# Patient Record
Sex: FEMALE | Race: WHITE | NOT HISPANIC OR LATINO | Employment: FULL TIME | URBAN - NONMETROPOLITAN AREA
[De-identification: names, ages, dates, MRNs, and addresses within clinical notes are randomized per-mention and may not be internally consistent; named-entity substitution may affect disease eponyms.]

---

## 2023-06-07 ENCOUNTER — OFFICE VISIT (OUTPATIENT)
Dept: PRIMARY CARE | Facility: CLINIC | Age: 28
End: 2023-06-07
Payer: MEDICAID

## 2023-06-07 ENCOUNTER — TELEPHONE (OUTPATIENT)
Dept: PRIMARY CARE | Facility: CLINIC | Age: 28
End: 2023-06-07

## 2023-06-07 VITALS
WEIGHT: 127 LBS | OXYGEN SATURATION: 99 % | HEART RATE: 70 BPM | BODY MASS INDEX: 20.41 KG/M2 | SYSTOLIC BLOOD PRESSURE: 101 MMHG | HEIGHT: 66 IN | DIASTOLIC BLOOD PRESSURE: 68 MMHG

## 2023-06-07 DIAGNOSIS — Z13.9 SCREENING FOR CONDITION: Primary | ICD-10-CM

## 2023-06-07 DIAGNOSIS — F41.9 ANXIETY: Primary | ICD-10-CM

## 2023-06-07 DIAGNOSIS — R21 RASH: ICD-10-CM

## 2023-06-07 PROCEDURE — 99214 OFFICE O/P EST MOD 30 MIN: CPT | Performed by: FAMILY MEDICINE

## 2023-06-07 PROCEDURE — 1036F TOBACCO NON-USER: CPT | Performed by: FAMILY MEDICINE

## 2023-06-07 RX ORDER — CLOTRIMAZOLE AND BETAMETHASONE DIPROPIONATE 10; .64 MG/G; MG/G
CREAM TOPICAL
Qty: 15 G | Refills: 0 | Status: SHIPPED | OUTPATIENT
Start: 2023-06-07

## 2023-06-07 RX ORDER — SERTRALINE HYDROCHLORIDE 25 MG/1
25 TABLET, FILM COATED ORAL
Qty: 30 TABLET | Refills: 1 | Status: SHIPPED | OUTPATIENT
Start: 2023-06-07 | End: 2023-08-14 | Stop reason: SDUPTHER

## 2023-06-07 RX ORDER — HYDROXYZINE HYDROCHLORIDE 10 MG/1
10 TABLET, FILM COATED ORAL EVERY 8 HOURS PRN
Qty: 30 TABLET | Refills: 0 | Status: SHIPPED | OUTPATIENT
Start: 2023-06-07 | End: 2023-06-17

## 2023-06-07 RX ORDER — HYDROXYZINE PAMOATE 25 MG/1
25 CAPSULE ORAL 3 TIMES DAILY PRN
COMMUNITY
End: 2023-06-07 | Stop reason: ALTCHOICE

## 2023-06-07 RX ORDER — SERTRALINE HYDROCHLORIDE 25 MG/1
25 TABLET, FILM COATED ORAL
COMMUNITY
End: 2023-06-07 | Stop reason: SDUPTHER

## 2023-06-07 ASSESSMENT — PATIENT HEALTH QUESTIONNAIRE - PHQ9
2. FEELING DOWN, DEPRESSED OR HOPELESS: SEVERAL DAYS
1. LITTLE INTEREST OR PLEASURE IN DOING THINGS: SEVERAL DAYS
SUM OF ALL RESPONSES TO PHQ9 QUESTIONS 1 AND 2: 2

## 2023-06-07 NOTE — PROGRESS NOTES
"Subjective     Dahiana Rey is a 27 y.o. female who presents for Dermatitis (patchy, itchy, generalized) and Depression (Would like to restart zoloft and vistaril-has been off for two years).      HPI  The patient is a 27 year-old female presenting to the clinic with complaints of dermatitis.  Discussed medication.  Patient presents with complaints of anxiety. Take  medication as directed.  Complaining anxiety and stress.  Feeling stressed and overwhelmed.  Denies depression.  Denies suicidal.  Denies homicidal.  Feeling stressed.  Complaining skin rash dry scaly skin rash on the upper and lower extremities and on the trunk.  Causing itching.    Review of Systems  Review of systems:    General:  Denies fever.  Denies chills.    HEENT: Denies nasal congestion.  Denies sinus pressure.    Respiratory:  Denies cough.  Denies shortness of breath.    Cardiovascular:  Denies chest pain.  Denies heart palpitations.  Denies shortness of breath.    Gastrointestinal:  Denies nausea vomiting diarrhea.  Denies abdominal pain.    Genitourinary: Denies burning urination.  Denies frequent urination.  Denies flank pain.  Denies blood in the urine.  Denies abnormal vaginal discharge.    Neurology:  Denies tingling numbness but denies weakness.  Denies headache.  Denies blurred vision.    Musculoskeletal:  Denies body aches.  Denies joint pains.  Denies muscle aches.  Denies muscle weakness    Endocrinology: Dictated as above.    Psychiatric: Dictated as above    Skin.  Dictated as above    Objective   /68   Pulse 70   Ht 1.676 m (5' 6\")   Wt 57.6 kg (127 lb)   SpO2 99%   BMI 20.50 kg/m²          Physical Exam  General.  Not in distress.  HEENT normocephalic anicteric sclerae.  Neck soft supple no thyromegaly.  Lungs are clear.  Heart regular.  Abdomen soft nontender nondistended bowel sounds are positive.  Extremities no clubbing cyanosis or edema.  Psychiatric.  Has good eye contact.  No crying spells noted.  Speech was " normal.  Denies depression.  Denies suicidal.  Denies homicidal.    homicidal.  Complaining anxiety and stress.  Denies depression.  Denies suicidal.  Denies homicidal.  Skin exam.  Dry scaly skin rash noted on upper and lower extremities and on the trunk.  Consistent with a fungal dermatitis.  Assessment/Plan   1.  Anxiety.  Counseled for anxiety and stress.  Denies depression.  Denies suicidal.  Denies homicidal.  Explained adverse course of medication.  Caution advised about medication including effects on pregnancy.  Does not wish to go for urine pregnancy test.    2.  Skin rash.  Educated on skin rash care.  Explained adverse effects of medication.    Problem List Items Addressed This Visit    None  Visit Diagnoses       Anxiety    -  Primary    Relevant Medications    sertraline (Zoloft) 25 mg tablet    hydrOXYzine HCL (Atarax) 10 mg tablet    Rash        Relevant Medications    clotrimazole-betamethasone (Lotrisone) cream          Scribe Attestation  By signing my name below, ILeona Scrdelta   attest that this documentation has been prepared under the direction and in the presence of Jim Rivera MD.

## 2023-06-12 ENCOUNTER — LAB (OUTPATIENT)
Dept: LAB | Facility: LAB | Age: 28
End: 2023-06-12
Payer: MEDICAID

## 2023-06-12 DIAGNOSIS — Z13.9 SCREENING FOR CONDITION: ICD-10-CM

## 2023-06-12 PROCEDURE — 36415 COLL VENOUS BLD VENIPUNCTURE: CPT

## 2023-06-12 PROCEDURE — 86481 TB AG RESPONSE T-CELL SUSP: CPT

## 2023-06-15 LAB
NIL(NEG) CONTROL SPOT COUNT: NORMAL
PANEL A SPOT COUNT: 0
PANEL B SPOT COUNT: 0
POS CONTROL SPOT COUNT: NORMAL
T-SPOT. TB INTERPRETATION: NEGATIVE

## 2023-06-19 ENCOUNTER — TELEPHONE (OUTPATIENT)
Dept: PRIMARY CARE | Facility: CLINIC | Age: 28
End: 2023-06-19
Payer: MEDICAID

## 2023-08-14 DIAGNOSIS — F41.9 ANXIETY: ICD-10-CM

## 2023-08-14 RX ORDER — SERTRALINE HYDROCHLORIDE 25 MG/1
25 TABLET, FILM COATED ORAL
Qty: 30 TABLET | Refills: 0 | Status: SHIPPED | OUTPATIENT
Start: 2023-08-14 | End: 2023-09-28 | Stop reason: DRUGHIGH

## 2023-09-27 PROBLEM — O41.00X0 OLIGOHYDRAMNIOS (HHS-HCC): Status: ACTIVE | Noted: 2020-06-05

## 2023-09-27 PROBLEM — F41.9 ANXIETY: Status: ACTIVE | Noted: 2023-09-27

## 2023-09-27 PROBLEM — O21.0 HYPEREMESIS GRAVIDARUM (HHS-HCC): Status: ACTIVE | Noted: 2019-11-04

## 2023-09-27 PROBLEM — R39.9 UTI SYMPTOMS: Status: ACTIVE | Noted: 2023-09-27

## 2023-09-27 PROBLEM — R10.2 VAGINAL PAIN: Status: ACTIVE | Noted: 2023-09-27

## 2023-09-27 PROBLEM — N89.8 DISCHARGE OF VAGINA: Status: ACTIVE | Noted: 2023-09-27

## 2023-09-27 PROBLEM — M62.838 LEVATOR SPASM: Status: ACTIVE | Noted: 2023-09-27

## 2023-09-27 PROBLEM — E78.5 DYSLIPIDEMIA: Status: ACTIVE | Noted: 2023-09-27

## 2023-09-27 PROBLEM — B37.9 YEAST INFECTION: Status: ACTIVE | Noted: 2023-09-27

## 2023-09-27 PROBLEM — M62.89 HIGH-TONE PELVIC FLOOR DYSFUNCTION: Status: ACTIVE | Noted: 2023-09-27

## 2023-09-27 PROBLEM — R89.9 ABNORMAL LABORATORY TEST: Status: ACTIVE | Noted: 2023-09-27

## 2023-09-27 PROBLEM — B96.89 BACTERIAL VAGINOSIS: Status: ACTIVE | Noted: 2023-09-27

## 2023-09-27 PROBLEM — F43.10 PTSD (POST-TRAUMATIC STRESS DISORDER): Status: ACTIVE | Noted: 2023-09-27

## 2023-09-27 PROBLEM — R53.82 CHRONIC FATIGUE: Status: ACTIVE | Noted: 2023-09-27

## 2023-09-27 PROBLEM — N94.10 DYSPAREUNIA, FEMALE: Status: ACTIVE | Noted: 2023-09-27

## 2023-09-27 PROBLEM — N76.0: Status: ACTIVE | Noted: 2023-09-27

## 2023-09-27 PROBLEM — Z98.891 STATUS POST PRIMARY LOW TRANSVERSE CESAREAN SECTION: Status: ACTIVE | Noted: 2020-06-05

## 2023-09-27 PROBLEM — E55.9 VITAMIN D DEFICIENCY: Status: ACTIVE | Noted: 2023-09-27

## 2023-09-27 PROBLEM — N76.0 BACTERIAL VAGINOSIS: Status: ACTIVE | Noted: 2023-09-27

## 2023-09-28 ENCOUNTER — LAB (OUTPATIENT)
Dept: LAB | Facility: LAB | Age: 28
End: 2023-09-28
Payer: MEDICAID

## 2023-09-28 ENCOUNTER — OFFICE VISIT (OUTPATIENT)
Dept: PRIMARY CARE | Facility: CLINIC | Age: 28
End: 2023-09-28
Payer: MEDICAID

## 2023-09-28 VITALS
WEIGHT: 135 LBS | DIASTOLIC BLOOD PRESSURE: 79 MMHG | BODY MASS INDEX: 21.69 KG/M2 | HEIGHT: 66 IN | SYSTOLIC BLOOD PRESSURE: 114 MMHG | OXYGEN SATURATION: 95 % | HEART RATE: 71 BPM

## 2023-09-28 DIAGNOSIS — Z13.0 SCREENING FOR DEFICIENCY ANEMIA: ICD-10-CM

## 2023-09-28 DIAGNOSIS — Z13.220 SCREENING FOR LIPID DISORDERS: ICD-10-CM

## 2023-09-28 DIAGNOSIS — Z13.29 SCREENING FOR THYROID DISORDER: ICD-10-CM

## 2023-09-28 DIAGNOSIS — Z13.21 ENCOUNTER FOR VITAMIN DEFICIENCY SCREENING: ICD-10-CM

## 2023-09-28 DIAGNOSIS — Z13.9 SCREENING DUE: ICD-10-CM

## 2023-09-28 DIAGNOSIS — B34.9 VIRAL SYNDROME: ICD-10-CM

## 2023-09-28 DIAGNOSIS — Z13.1 SCREENING FOR DIABETES MELLITUS (DM): ICD-10-CM

## 2023-09-28 DIAGNOSIS — R53.83 OTHER FATIGUE: ICD-10-CM

## 2023-09-28 DIAGNOSIS — F32.A DEPRESSION, UNSPECIFIED DEPRESSION TYPE: ICD-10-CM

## 2023-09-28 DIAGNOSIS — Z13.89 SCREENING FOR BLOOD OR PROTEIN IN URINE: ICD-10-CM

## 2023-09-28 DIAGNOSIS — F41.9 ANXIETY: ICD-10-CM

## 2023-09-28 DIAGNOSIS — Z23 FLU VACCINE NEED: ICD-10-CM

## 2023-09-28 DIAGNOSIS — R53.83 OTHER FATIGUE: Primary | ICD-10-CM

## 2023-09-28 LAB
ALANINE AMINOTRANSFERASE (SGPT) (U/L) IN SER/PLAS: 10 U/L (ref 7–45)
ALBUMIN (G/DL) IN SER/PLAS: 4.3 G/DL (ref 3.4–5)
ALKALINE PHOSPHATASE (U/L) IN SER/PLAS: 57 U/L (ref 33–110)
ANION GAP IN SER/PLAS: 12 MMOL/L (ref 10–20)
APPEARANCE, URINE: CLEAR
ASPARTATE AMINOTRANSFERASE (SGOT) (U/L) IN SER/PLAS: 15 U/L (ref 9–39)
BACTERIA, URINE: ABNORMAL /HPF
BASOPHILS (10*3/UL) IN BLOOD BY AUTOMATED COUNT: 0.03 X10E9/L (ref 0–0.1)
BASOPHILS/100 LEUKOCYTES IN BLOOD BY AUTOMATED COUNT: 0.5 % (ref 0–2)
BILIRUBIN TOTAL (MG/DL) IN SER/PLAS: 0.3 MG/DL (ref 0–1.2)
BILIRUBIN, URINE: NEGATIVE
BLOOD, URINE: ABNORMAL
C REACTIVE PROTEIN (MG/L) IN SER/PLAS: 0.61 MG/DL
CALCIUM (MG/DL) IN SER/PLAS: 9.2 MG/DL (ref 8.6–10.3)
CARBON DIOXIDE, TOTAL (MMOL/L) IN SER/PLAS: 29 MMOL/L (ref 21–32)
CHLORIDE (MMOL/L) IN SER/PLAS: 100 MMOL/L (ref 98–107)
COLOR, URINE: YELLOW
CREATININE (MG/DL) IN SER/PLAS: 0.69 MG/DL (ref 0.5–1.05)
EOSINOPHILS (10*3/UL) IN BLOOD BY AUTOMATED COUNT: 0.19 X10E9/L (ref 0–0.7)
EOSINOPHILS/100 LEUKOCYTES IN BLOOD BY AUTOMATED COUNT: 2.9 % (ref 0–6)
ERYTHROCYTE DISTRIBUTION WIDTH (RATIO) BY AUTOMATED COUNT: 11.4 % (ref 11.5–14.5)
ERYTHROCYTE MEAN CORPUSCULAR HEMOGLOBIN CONCENTRATION (G/DL) BY AUTOMATED: 32.5 G/DL (ref 32–36)
ERYTHROCYTE MEAN CORPUSCULAR VOLUME (FL) BY AUTOMATED COUNT: 88 FL (ref 80–100)
ERYTHROCYTES (10*6/UL) IN BLOOD BY AUTOMATED COUNT: 4.5 X10E12/L (ref 4–5.2)
FERRITIN (UG/LL) IN SER/PLAS: 43 UG/L (ref 8–150)
GFR FEMALE: >90 ML/MIN/1.73M2
GLUCOSE (MG/DL) IN SER/PLAS: 78 MG/DL (ref 74–99)
GLUCOSE, URINE: NEGATIVE MG/DL
HEMATOCRIT (%) IN BLOOD BY AUTOMATED COUNT: 39.4 % (ref 36–46)
HEMOGLOBIN (G/DL) IN BLOOD: 12.8 G/DL (ref 12–16)
IMMATURE GRANULOCYTES/100 LEUKOCYTES IN BLOOD BY AUTOMATED COUNT: 0 % (ref 0–0.9)
IRON (UG/DL) IN SER/PLAS: 126 UG/DL (ref 35–150)
IRON BINDING CAPACITY (UG/DL) IN SER/PLAS: 510 UG/DL (ref 240–445)
IRON SATURATION (%) IN SER/PLAS: 25 % (ref 25–45)
KETONES, URINE: NEGATIVE MG/DL
LEUKOCYTE ESTERASE, URINE: NEGATIVE
LEUKOCYTES (10*3/UL) IN BLOOD BY AUTOMATED COUNT: 6.6 X10E9/L (ref 4.4–11.3)
LYMPHOCYTES (10*3/UL) IN BLOOD BY AUTOMATED COUNT: 1.78 X10E9/L (ref 1.2–4.8)
LYMPHOCYTES/100 LEUKOCYTES IN BLOOD BY AUTOMATED COUNT: 27.1 % (ref 13–44)
MAGNESIUM (MG/DL) IN SER/PLAS: 2.13 MG/DL (ref 1.6–2.4)
MONOCYTES (10*3/UL) IN BLOOD BY AUTOMATED COUNT: 0.37 X10E9/L (ref 0.1–1)
MONOCYTES/100 LEUKOCYTES IN BLOOD BY AUTOMATED COUNT: 5.6 % (ref 2–10)
MUCUS, URINE: ABNORMAL /LPF
NEUTROPHILS (10*3/UL) IN BLOOD BY AUTOMATED COUNT: 4.2 X10E9/L (ref 1.2–7.7)
NEUTROPHILS/100 LEUKOCYTES IN BLOOD BY AUTOMATED COUNT: 63.9 % (ref 40–80)
NITRITE, URINE: NEGATIVE
PH, URINE: 6 (ref 5–8)
PLATELETS (10*3/UL) IN BLOOD AUTOMATED COUNT: 373 X10E9/L (ref 150–450)
POTASSIUM (MMOL/L) IN SER/PLAS: 3.4 MMOL/L (ref 3.5–5.3)
PROTEIN TOTAL: 7.3 G/DL (ref 6.4–8.2)
PROTEIN, URINE: NEGATIVE MG/DL
RBC, URINE: 8 /HPF (ref 0–5)
SEDIMENTATION RATE, ERYTHROCYTE: 8 MM/H (ref 0–20)
SODIUM (MMOL/L) IN SER/PLAS: 138 MMOL/L (ref 136–145)
SPECIFIC GRAVITY, URINE: 1.01 (ref 1–1.03)
SQUAMOUS EPITHELIAL CELLS, URINE: 2 /HPF
THYROTROPIN (MIU/L) IN SER/PLAS BY DETECTION LIMIT <= 0.05 MIU/L: 1.4 MIU/L (ref 0.44–3.98)
UREA NITROGEN (MG/DL) IN SER/PLAS: 15 MG/DL (ref 6–23)
UROBILINOGEN, URINE: <2 MG/DL (ref 0–1.9)
WBC, URINE: 2 /HPF (ref 0–5)

## 2023-09-28 PROCEDURE — 86431 RHEUMATOID FACTOR QUANT: CPT

## 2023-09-28 PROCEDURE — 83550 IRON BINDING TEST: CPT

## 2023-09-28 PROCEDURE — 1036F TOBACCO NON-USER: CPT

## 2023-09-28 PROCEDURE — 83036 HEMOGLOBIN GLYCOSYLATED A1C: CPT

## 2023-09-28 PROCEDURE — 99214 OFFICE O/P EST MOD 30 MIN: CPT

## 2023-09-28 PROCEDURE — 81001 URINALYSIS AUTO W/SCOPE: CPT

## 2023-09-28 PROCEDURE — 86038 ANTINUCLEAR ANTIBODIES: CPT

## 2023-09-28 PROCEDURE — 84443 ASSAY THYROID STIM HORMONE: CPT

## 2023-09-28 PROCEDURE — 80053 COMPREHEN METABOLIC PANEL: CPT

## 2023-09-28 PROCEDURE — 86235 NUCLEAR ANTIGEN ANTIBODY: CPT

## 2023-09-28 PROCEDURE — 82746 ASSAY OF FOLIC ACID SERUM: CPT

## 2023-09-28 PROCEDURE — 82728 ASSAY OF FERRITIN: CPT

## 2023-09-28 PROCEDURE — 85025 COMPLETE CBC W/AUTO DIFF WBC: CPT

## 2023-09-28 PROCEDURE — 83540 ASSAY OF IRON: CPT

## 2023-09-28 PROCEDURE — 86200 CCP ANTIBODY: CPT

## 2023-09-28 PROCEDURE — 90471 IMMUNIZATION ADMIN: CPT

## 2023-09-28 PROCEDURE — 83735 ASSAY OF MAGNESIUM: CPT

## 2023-09-28 PROCEDURE — 36415 COLL VENOUS BLD VENIPUNCTURE: CPT

## 2023-09-28 PROCEDURE — 90686 IIV4 VACC NO PRSV 0.5 ML IM: CPT

## 2023-09-28 PROCEDURE — 82652 VIT D 1 25-DIHYDROXY: CPT

## 2023-09-28 PROCEDURE — 82607 VITAMIN B-12: CPT

## 2023-09-28 PROCEDURE — 86140 C-REACTIVE PROTEIN: CPT

## 2023-09-28 PROCEDURE — 85652 RBC SED RATE AUTOMATED: CPT

## 2023-09-28 RX ORDER — SERTRALINE HYDROCHLORIDE 50 MG/1
50 TABLET, FILM COATED ORAL DAILY
Qty: 90 TABLET | Refills: 3 | Status: SHIPPED | OUTPATIENT
Start: 2023-09-28

## 2023-09-28 ASSESSMENT — ENCOUNTER SYMPTOMS
FATIGUE: 1
DEPRESSION: 1
HEADACHES: 1
MUSCULOSKELETAL NEGATIVE: 1
GASTROINTESTINAL NEGATIVE: 1
HEMATOLOGIC/LYMPHATIC NEGATIVE: 1
CARDIOVASCULAR NEGATIVE: 1
ALLERGIC/IMMUNOLOGIC NEGATIVE: 1
RESPIRATORY NEGATIVE: 1

## 2023-09-28 ASSESSMENT — PATIENT HEALTH QUESTIONNAIRE - PHQ9
2. FEELING DOWN, DEPRESSED OR HOPELESS: SEVERAL DAYS
10. IF YOU CHECKED OFF ANY PROBLEMS, HOW DIFFICULT HAVE THESE PROBLEMS MADE IT FOR YOU TO DO YOUR WORK, TAKE CARE OF THINGS AT HOME, OR GET ALONG WITH OTHER PEOPLE: VERY DIFFICULT
1. LITTLE INTEREST OR PLEASURE IN DOING THINGS: SEVERAL DAYS
SUM OF ALL RESPONSES TO PHQ9 QUESTIONS 1 AND 2: 2

## 2023-09-28 NOTE — LETTER
Regarding:  Dahiana Rey   1995    Flu vaccine (IIV4), preservative free *Check age/dose*    Linked Order: [id number]   Current Status    Updated on: 2023  1:30 PM    Name Date Status Dose VIS Date Route Site  Lot# Given By Verified By   Flu vaccine (IIV4), preservative free *Check age/dose* 2023 Given 0.5 mL 2021 IM LD [email or web address] [id number] Demetria Easton LPN --   Exp. Date NDC # Product Time Location External Inventory Class Comment   2024 37931-517-53 Flulaval Quad [id number] -- -- -- General --   Question Answer Comment   VIS Presented Date     Updated by: JASE Rodas APRN, CNP

## 2023-09-28 NOTE — PATIENT INSTRUCTIONS
Follow up in 6 months    It was great to see you today!  Please continue taking your prescribed medications.   Refill have been sent to your pharmacy.    I have ordered some labs to be done as soon as you can.  We will call you with the results.

## 2023-09-28 NOTE — PROGRESS NOTES
"Subjective   Patient ID: Dahiana Rey is a 27 y.o. female who presents for Depression (Has increase in depression and feels may need higher dose), Flu Vaccine, Alopecia (Pt states has had hair loss after being sick), and Fatigue (Feels tired all the time, pt states that it has somewhat improved as she is no longer narcoleptic.).    Dahiana reports being sick most of August with remitting symptoms.  As soon as she would start to think she was getting better she would get more symptoms.  She stated she has never been that sick in her life. 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 She reports that she sleeps well at night but finds herself nodding of in the day as if she has \"narcolepsy\".  She feels her depression is a little worse and is requesting if we could adjust her dose.  She reports she has had hair loss and weight gain of 10-15 pounds since she was sick.    Depression  Fatigue  This is a chronic problem. The current episode started more than 1 year ago. The problem has been gradually worsening. Associated symptoms include fatigue and headaches. Nothing aggravates the symptoms. She has tried sleep and rest for the symptoms. The treatment provided mild relief.        Review of Systems   Constitutional:  Positive for fatigue.   HENT: Negative.     Respiratory: Negative.     Cardiovascular: Negative.    Gastrointestinal: Negative.    Genitourinary: Negative.    Musculoskeletal: Negative.    Skin: Negative.    Allergic/Immunologic: Negative.    Neurological:  Positive for headaches.   Hematological: Negative.    Psychiatric/Behavioral:  Positive for depression.        Objective   /79 (BP Location: Right arm)   Pulse 71   Ht 1.676 m (5' 6\")   Wt 61.2 kg (135 lb)   SpO2 95%   BMI 21.79 kg/m²     Physical Exam  Constitutional:       Appearance: Normal appearance. She is " normal weight.   HENT:      Head: Normocephalic.      Right Ear: Tympanic membrane and ear canal normal.      Left Ear: Tympanic membrane normal.      Nose: Nose normal.      Mouth/Throat:      Mouth: Mucous membranes are moist.      Pharynx: Oropharynx is clear.   Eyes:      Extraocular Movements: Extraocular movements intact.      Conjunctiva/sclera: Conjunctivae normal.      Pupils: Pupils are equal, round, and reactive to light.   Cardiovascular:      Rate and Rhythm: Normal rate and regular rhythm.      Pulses: Normal pulses.      Heart sounds: Normal heart sounds.   Pulmonary:      Effort: Pulmonary effort is normal.      Breath sounds: Normal breath sounds.   Abdominal:      General: Abdomen is flat. Bowel sounds are normal.      Palpations: Abdomen is soft.   Musculoskeletal:         General: Normal range of motion.      Cervical back: Normal range of motion and neck supple.   Skin:     General: Skin is warm and dry.      Capillary Refill: Capillary refill takes less than 2 seconds.   Neurological:      General: No focal deficit present.      Mental Status: She is alert. Mental status is at baseline.   Psychiatric:         Mood and Affect: Mood normal.         Behavior: Behavior normal.         Thought Content: Thought content normal.         Judgment: Judgment normal.         Assessment/Plan   Diagnoses and all orders for this visit:  Other fatigue  -     CBC and Auto Differential; Future  -     VERENA with Reflex to ARLENE; Future  -     Anti-SSA; Future  -     Anti-SSB; Future  -     Citrulline Antibody, IgG; Future  -     C-Reactive Protein; Future  -     Ferritin; Future  -     Folate; Future  -     Sedimentation Rate; Future  -     Magnesium; Future  -     Rheumatoid Factor; Future  -     Iron and TIBC; Future  Flu vaccine need  Anxiety  -     increase sertraline (Zoloft) 50 mg tablet; Take 1 tablet (50 mg) by mouth once daily.  Viral syndrome  -     CBC and Auto Differential; Future  -     VERENA with Reflex  to ARLENE; Future  -     Anti-SSA; Future  -     Anti-SSB; Future  -     Citrulline Antibody, IgG; Future  -     C-Reactive Protein; Future  -     Ferritin; Future  -     Folate; Future  -     Sedimentation Rate; Future  -     Magnesium; Future  -     Rheumatoid Factor; Future  -     Iron and TIBC; Future  -     Comprehensive Metabolic Panel; Future  Screening for deficiency anemia  -     CBC and Auto Differential; Future  Screening for thyroid disorder  -     TSH with reflex to Free T4 if abnormal; Future  Screening for diabetes mellitus (DM)  -     Hemoglobin A1C; Future  -     Urinalysis with Reflex Microscopic; Future  Screening for lipid disorders  -     Lipid Panel; Future  -     Comprehensive Metabolic Panel; Future  Encounter for vitamin deficiency screening  -     Folate; Future  -     Vitamin D 1,25 Dihydroxy (for eval of hypercalcemia); Future  -     Vitamin B12; Future  -     Iron and TIBC; Future  -     Comprehensive Metabolic Panel; Future  Depression, unspecified depression type  -     sertraline (Zoloft) 50 mg tablet; Take 1 tablet (50 mg) by mouth once daily.  Screening due  -     CBC and Auto Differential; Future  -     Comprehensive Metabolic Panel; Future  Screening for blood or protein in urine  -     Urinalysis with Reflex Microscopic; Future  Other orders  -     Flu vaccine (IIV4) age 6 months and greater, preservative free     Follow up in 6 months    It was great to see you today!  Please continue taking your prescribed medications.   Refill have been sent to your pharmacy.    I have ordered some labs to be done as soon as you can.  We will call you with the results.

## 2023-09-28 NOTE — LETTER
Flu vaccine (IIV4), preservative free *Check age/dose*    Linked Order: [id number]   Current Status    Updated on: 9/28/2023  1:30 PM    Name Date Status Dose VIS Date Route Site  Lot# Given By Verified By   Flu vaccine (IIV4), preservative free *Check age/dose* 9/28/2023 Given 0.5 mL 8/6/2021 IM LD [facility] [id number] Demetria Easton LPN --   Exp. Date NDC # Product Time Location External Inventory Class Comment   6/25/2024 84835-651-64 Flulaval Quad [id number] -- -- -- General --   Question Answer Comment   VIS Presented Date     Updated by: Demetria Easton LPN               Previous Statuses    Updated on: 9/28/2023  1:26 PM    Name Date Status Dose VIS Date Route Site  Lot# Given By Verified By   Flu vaccine (IIV4), preservative free *Check age/dose* 9/28/2023 Incomplete 0.5 mL 8/6/2021 IM LD -- -- -- --     Exp. Date NDC # Product Time Location External Inventory Class Comment   -- -- -- -- -- -- -- --   Updated by: DONNA Go-DONNA Arndt, CNP

## 2023-09-29 DIAGNOSIS — D50.0 IRON DEFICIENCY ANEMIA DUE TO CHRONIC BLOOD LOSS: Primary | ICD-10-CM

## 2023-09-29 DIAGNOSIS — E87.6 POTASSIUM (K) DEFICIENCY: ICD-10-CM

## 2023-09-29 LAB
ANTI-NUCLEAR ANTIBODY (ANA): NEGATIVE
ANTI-SSA: <0.2 AI
ANTI-SSB: <0.2 AI
CITRULLINE ANTIBODY, IGG: <1 U/ML
COBALAMIN (VITAMIN B12) (PG/ML) IN SER/PLAS: 258 PG/ML (ref 211–911)
ESTIMATED AVERAGE GLUCOSE FOR HBA1C: 91 MG/DL
FOLATE (NG/ML) IN SER/PLAS: 19.2 NG/ML
HEMOGLOBIN A1C/HEMOGLOBIN TOTAL IN BLOOD: 4.8 %
RHEUMATOID FACTOR (IU/ML) IN SERUM OR PLASMA: <10 IU/ML (ref 0–15)

## 2023-09-29 RX ORDER — POTASSIUM CHLORIDE 20 MEQ/1
20 TABLET, EXTENDED RELEASE ORAL 2 TIMES DAILY
Qty: 4 TABLET | Refills: 0 | Status: SHIPPED | OUTPATIENT
Start: 2023-09-29 | End: 2023-10-01

## 2023-09-29 RX ORDER — FERROUS SULFATE 325(65) MG
65 TABLET ORAL
Qty: 30 TABLET | Refills: 11 | Status: SHIPPED | OUTPATIENT
Start: 2023-09-29 | End: 2024-09-28

## 2023-10-02 LAB — VITAMIN D 1,25-DIHYDROXY: 70.7 PG/ML (ref 19.9–79.3)
